# Patient Record
Sex: FEMALE | Race: WHITE | NOT HISPANIC OR LATINO | URBAN - METROPOLITAN AREA
[De-identification: names, ages, dates, MRNs, and addresses within clinical notes are randomized per-mention and may not be internally consistent; named-entity substitution may affect disease eponyms.]

---

## 2020-07-25 ENCOUNTER — TELEPHONE ENCOUNTER (OUTPATIENT)
Dept: URBAN - METROPOLITAN AREA CLINIC 13 | Facility: CLINIC | Age: 65
End: 2020-07-25

## 2020-07-25 RX ORDER — POLYETHYLENE GLYCOL 3350, SODIUM SULFATE, SODIUM CHLORIDE, POTASSIUM CHLORIDE, ASCORBIC ACID, SODIUM ASCORBATE 7.5-2.691G
USE AS DIRECTED KIT ORAL
Qty: 1 | Refills: 0 | OUTPATIENT
Start: 2014-05-20 | End: 2014-06-24

## 2020-07-26 ENCOUNTER — TELEPHONE ENCOUNTER (OUTPATIENT)
Dept: URBAN - METROPOLITAN AREA CLINIC 13 | Facility: CLINIC | Age: 65
End: 2020-07-26

## 2020-07-26 RX ORDER — LOTEPREDNOL ETABONATE 2 MG/ML
SUSPENSION/ DROPS OPHTHALMIC
Qty: 5 | Refills: 0 | Status: ACTIVE | COMMUNITY
Start: 2014-04-28

## 2020-07-26 RX ORDER — MONTELUKAST SODIUM 10 MG/1
TAKE 1 TABLET DAILY TABLET, FILM COATED ORAL
Refills: 0 | Status: ACTIVE | COMMUNITY
Start: 2014-05-20

## 2020-07-26 RX ORDER — AMOXICILLIN AND CLAVULANATE POTASSIUM 875; 125 MG/1; MG/1
TABLET, FILM COATED ORAL
Qty: 20 | Refills: 0 | Status: ACTIVE | COMMUNITY
Start: 2013-11-26

## 2020-07-26 RX ORDER — ALBUTEROL SULFATE 90 UG/1
INHALE 1 TO 2 PUFFS EVERY 4 TO 6 HOURS AS NEEDED AEROSOL, METERED RESPIRATORY (INHALATION)
Refills: 0 | Status: ACTIVE | COMMUNITY
Start: 2014-05-20

## 2020-07-26 RX ORDER — DICLOFENAC SODIUM 1 %
GEL (GRAM) TOPICAL
Qty: 255 | Refills: 0 | Status: ACTIVE | COMMUNITY
Start: 2013-09-16

## 2020-07-26 RX ORDER — MULTIVITAMIN
TAKE 1 CAPSULE DAILY CAPSULE ORAL
Refills: 0 | Status: ACTIVE | COMMUNITY

## 2020-07-26 RX ORDER — ALPRAZOLAM 0.5 MG/1
TABLET ORAL
Qty: 1 | Refills: 0 | Status: ACTIVE | COMMUNITY
Start: 2013-09-27

## 2020-07-26 RX ORDER — GABAPENTIN 300 MG/1
CAPSULE ORAL
Qty: 42 | Refills: 0 | Status: ACTIVE | COMMUNITY
Start: 2013-07-25

## 2020-07-26 RX ORDER — AMLODIPINE BESYLATE 2.5 MG/1
TAKE 1 TABLET DAILY TABLET ORAL
Refills: 0 | Status: ACTIVE | COMMUNITY
Start: 2014-05-20

## 2024-01-23 ENCOUNTER — OFFICE VISIT (OUTPATIENT)
Dept: URBAN - METROPOLITAN AREA CLINIC 113 | Facility: CLINIC | Age: 69
End: 2024-01-23
Payer: MEDICARE

## 2024-01-23 VITALS
BODY MASS INDEX: 22.36 KG/M2 | TEMPERATURE: 97.5 F | HEIGHT: 65 IN | DIASTOLIC BLOOD PRESSURE: 73 MMHG | HEART RATE: 84 BPM | WEIGHT: 134.2 LBS | RESPIRATION RATE: 20 BRPM | SYSTOLIC BLOOD PRESSURE: 119 MMHG

## 2024-01-23 DIAGNOSIS — K70.30 ALCOHOLIC CIRRHOSIS: ICD-10-CM

## 2024-01-23 DIAGNOSIS — R79.89 ELEVATED LFTS: ICD-10-CM

## 2024-01-23 PROBLEM — 420054005: Status: ACTIVE | Noted: 2024-01-23

## 2024-01-23 PROCEDURE — 99204 OFFICE O/P NEW MOD 45 MIN: CPT

## 2024-01-23 PROCEDURE — 99244 OFF/OP CNSLTJ NEW/EST MOD 40: CPT

## 2024-01-23 RX ORDER — PANTOPRAZOLE SODIUM 40 MG/1
1 TABLET TABLET, DELAYED RELEASE ORAL ONCE A DAY
Status: ACTIVE | COMMUNITY

## 2024-01-23 RX ORDER — ACETAMINOPHEN 500 MG/1
1 TABLET AS NEEDED TABLET ORAL
Status: ACTIVE | COMMUNITY

## 2024-01-23 RX ORDER — MULTIVIT-MIN/IRON/FOLIC ACID/K 18-600-40
AS DIRECTED CAPSULE ORAL
Status: ACTIVE | COMMUNITY

## 2024-01-23 RX ORDER — POLYVINYL ALCOHOL, POVIDONE 14; 6 MG/ML; MG/ML
AS DIRECTED SOLUTION/ DROPS OPHTHALMIC
Status: ACTIVE | COMMUNITY

## 2024-01-23 RX ORDER — MULTIVITAMIN WITH IRON
1 TABLET WITH A MEAL TABLET ORAL ONCE A DAY
Status: ACTIVE | COMMUNITY

## 2024-01-23 RX ORDER — CHOLECALCIFEROL (VITAMIN D3) 50 MCG
1 CAPSULE CAPSULE ORAL ONCE A DAY
Status: ACTIVE | COMMUNITY

## 2024-01-23 RX ORDER — LACTULOSE 10 G/15ML
15 ML AS NEEDED SOLUTION ORAL ONCE A DAY
Status: ACTIVE | COMMUNITY

## 2024-01-23 RX ORDER — OXYCODONE HYDROCHLORIDE 5 MG/1
1/2 TABLET AS NEEDED TABLET ORAL
Refills: 0 | Status: ACTIVE | COMMUNITY

## 2024-01-23 RX ORDER — SPIRONOLACTONE 50 MG/1
1 TABLET TABLET, FILM COATED ORAL ONCE A DAY
Status: ACTIVE | COMMUNITY

## 2024-01-23 RX ORDER — PROPRANOLOL HYDROCHLORIDE 10 MG/1
1 TABLET TABLET ORAL ONCE A DAY
Status: ACTIVE | COMMUNITY

## 2024-01-23 RX ORDER — CALCIUM CARBONATE 500 MG/1
1 TABLET TABLET ORAL ONCE A DAY
Status: ACTIVE | COMMUNITY

## 2024-01-23 RX ORDER — LORATADINE 10 MG
1 TABLET TABLET ORAL ONCE A DAY
Status: ACTIVE | COMMUNITY

## 2024-01-23 RX ORDER — ASPIRIN 81 MG/1
1 TABLET TABLET, COATED ORAL ONCE A DAY
Status: ACTIVE | COMMUNITY

## 2024-01-23 RX ORDER — ATORVASTATIN CALCIUM 20 MG/1
1 TABLET TABLET, FILM COATED ORAL ONCE A DAY
Status: ACTIVE | COMMUNITY

## 2024-01-23 RX ORDER — FLUTICASONE PROPIONATE 50 UG/1
1 SPRAY IN EACH NOSTRIL SPRAY, METERED NASAL ONCE A DAY
Status: ACTIVE | COMMUNITY

## 2024-01-23 NOTE — HPI-TODAY'S VISIT:
This is a 68-year-old woman with a history of cirrhosis, from cytopenia, hyperlipidemia.  She was referred by Dr. Blake Ramesh to establish care for her liver cirrhosis.  A copy of today's visit will be forwarded to referring provider. Her most recent labs available for review from 11/15/2023 showed low BUN 7, low serum creatinine 0.50, elevated alk phos 165, elevated AST 46, ALT 15, total bili 0.8, low RBC count 3.73, low hematocrit 32.9, low platelets 136,000, TSH 2.980, A1c 5.0%, vitamin D40.7,.  Her labs from 11/27/2023 show normal hemoglobin electrophoresis, elevated vitamin B12 1000 189, folate 19.5, ammonia 88. Last colonoscopy from 6/24/2014 showed good bowel prep, one 6 mm polyp, one 4 mm polyp, diverticulosis in the entire examined colon, non-bleeding internal hemorrhoids. Pathology revealed tubular adenoma and benign hyperplastic polyp. She is recommended repeat in 5 years. Reports she did cologuard recently that was normal. She reports March 2023 she was admitted to [facility] for vomiting blood and underwent esophageal varices banding with Dr. Peña. She has been unable to follow-up with their office. She reports she was told by Dr. 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 She fell september 2023 and states she stopped drinking that day as she broke her leg and was in rehab for sometime. She has not drank since then. Previously she would drink 3-4 glasses of wine per day. She reports one time she was donating blood and told she could not donate due to Hepatitis B infection. She does not believe she has ever been evaluated for viral, autoimmune, or hereditary etiology for liver disease. With lactulose she has regular bowel movements. There is no blood per rectum or melena. There is no abdominal bloating. No jaundice or icterus. GERD symptoms well controlled with pantoprazole

## 2024-01-29 LAB
ABSOLUTE BASOPHILS: 50
ABSOLUTE EOSINOPHILS: 281
ABSOLUTE LYMPHOCYTES: 1832
ABSOLUTE MONOCYTES: 495
ABSOLUTE NEUTROPHILS: 2844
ACTIN (SMOOTH MUSCLE) ANTIBODY (IGG): <20
AFP, SERUM, TUMOR MARKER: 4.7
ALBUMIN/GLOBULIN RATIO: 1.4
ALBUMIN: 3.6
ALKALINE PHOSPHATASE: 149
ALPHA-1-ANTITRYPSIN (AAT) PHENOTYPE: (no result)
ALT (SGPT): 9
ANA SCREEN, IFA: NEGATIVE
AST (SGOT): 29
BASOPHILS: 0.9
BILIRUBIN, DIRECT: 0.3
BILIRUBIN, INDIRECT: 0.6
BILIRUBIN, TOTAL: 0.9
COMMENT(S): (no result)
EOSINOPHILS: 5.1
FERRITIN, SERUM: 47
GLOBULIN: 2.6
HEMATOCRIT: 38.5
HEMOGLOBIN: 13
HEPATITIS A AB, TOTAL: REACTIVE
HEPATITIS B CORE AB TOTAL: (no result)
HEPATITIS B SURFACE AB IMMUNITY, QN: <5
HEPATITIS B SURFACE ANTIGEN: (no result)
HEPATITIS C ANTIBODY: (no result)
INR: 1.1
IRON BIND.CAP.(TIBC): 393
IRON SATURATION: 28
IRON: 111
LKM-1 ANTIBODY (IGG): <=20
LYMPHOCYTES: 33.3
MCH: 30.7
MCHC: 33.8
MCV: 91
MITOCHONDRIAL (M2) ANTIBODY: <=20
MONOCYTES: 9
MPV: 10
NEUTROPHILS: 51.7
PLATELET COUNT: 123
PROTEIN, TOTAL: 6.2
PT: 11.9
RDW: 14.9
RED BLOOD CELL COUNT: 4.23
WHITE BLOOD CELL COUNT: 5.5

## 2024-01-30 ENCOUNTER — TELEPHONE ENCOUNTER (OUTPATIENT)
Dept: URBAN - METROPOLITAN AREA CLINIC 113 | Facility: CLINIC | Age: 69
End: 2024-01-30

## 2024-03-27 ENCOUNTER — OV EP (OUTPATIENT)
Dept: URBAN - METROPOLITAN AREA CLINIC 113 | Facility: CLINIC | Age: 69
End: 2024-03-27

## 2024-04-24 ENCOUNTER — OV EP (OUTPATIENT)
Dept: URBAN - METROPOLITAN AREA CLINIC 113 | Facility: CLINIC | Age: 69
End: 2024-04-24
Payer: MEDICARE

## 2024-04-24 ENCOUNTER — LAB (OUTPATIENT)
Dept: URBAN - METROPOLITAN AREA CLINIC 113 | Facility: CLINIC | Age: 69
End: 2024-04-24

## 2024-04-24 VITALS — RESPIRATION RATE: 20 BRPM | TEMPERATURE: 97.2 F | HEIGHT: 65 IN | WEIGHT: 134.8 LBS | BODY MASS INDEX: 22.46 KG/M2

## 2024-04-24 DIAGNOSIS — I85.10 SECONDARY ESOPHAGEAL VARICES WITHOUT BLEEDING: ICD-10-CM

## 2024-04-24 DIAGNOSIS — K70.30 ALCOHOLIC CIRRHOSIS, UNSPECIFIED WHETHER ASCITES PRESENT: ICD-10-CM

## 2024-04-24 PROBLEM — 14223005: Status: ACTIVE | Noted: 2024-04-24

## 2024-04-24 PROCEDURE — 99213 OFFICE O/P EST LOW 20 MIN: CPT | Performed by: NURSE PRACTITIONER

## 2024-04-24 RX ORDER — POLYVINYL ALCOHOL, POVIDONE 14; 6 MG/ML; MG/ML
AS DIRECTED SOLUTION/ DROPS OPHTHALMIC
Status: ACTIVE | COMMUNITY

## 2024-04-24 RX ORDER — SPIRONOLACTONE 50 MG/1
1 TABLET TABLET, FILM COATED ORAL ONCE A DAY
Status: ACTIVE | COMMUNITY

## 2024-04-24 RX ORDER — OXYCODONE HYDROCHLORIDE 5 MG/1
1/2 TABLET AS NEEDED TABLET ORAL
Refills: 0 | Status: ACTIVE | COMMUNITY

## 2024-04-24 RX ORDER — PROPRANOLOL HYDROCHLORIDE 10 MG/1
1 TABLET TABLET ORAL ONCE A DAY
Status: ACTIVE | COMMUNITY

## 2024-04-24 RX ORDER — LACTULOSE 10 G/15ML
15 ML AS NEEDED SOLUTION ORAL ONCE A DAY
OUTPATIENT

## 2024-04-24 RX ORDER — MULTIVIT-MIN/IRON/FOLIC ACID/K 18-600-40
AS DIRECTED CAPSULE ORAL
Status: ACTIVE | COMMUNITY

## 2024-04-24 RX ORDER — SPIRONOLACTONE 50 MG/1
1 TABLET TABLET, FILM COATED ORAL ONCE A DAY
OUTPATIENT

## 2024-04-24 RX ORDER — FLUTICASONE PROPIONATE 50 UG/1
1 SPRAY IN EACH NOSTRIL SPRAY, METERED NASAL ONCE A DAY
Status: ACTIVE | COMMUNITY

## 2024-04-24 RX ORDER — CHOLECALCIFEROL (VITAMIN D3) 50 MCG
1 CAPSULE CAPSULE ORAL ONCE A DAY
Status: ACTIVE | COMMUNITY

## 2024-04-24 RX ORDER — ACETAMINOPHEN 500 MG/1
1 TABLET AS NEEDED TABLET ORAL
Status: ACTIVE | COMMUNITY

## 2024-04-24 RX ORDER — MULTIVITAMIN WITH IRON
1 TABLET WITH A MEAL TABLET ORAL ONCE A DAY
Status: ACTIVE | COMMUNITY

## 2024-04-24 RX ORDER — PANTOPRAZOLE SODIUM 40 MG/1
1 TABLET TABLET, DELAYED RELEASE ORAL ONCE A DAY
OUTPATIENT

## 2024-04-24 RX ORDER — LACTULOSE 10 G/15ML
15 ML AS NEEDED SOLUTION ORAL ONCE A DAY
Status: ACTIVE | COMMUNITY

## 2024-04-24 RX ORDER — PANTOPRAZOLE SODIUM 40 MG/1
1 TABLET TABLET, DELAYED RELEASE ORAL ONCE A DAY
Status: ACTIVE | COMMUNITY

## 2024-04-24 RX ORDER — ATORVASTATIN CALCIUM 20 MG/1
1 TABLET TABLET, FILM COATED ORAL ONCE A DAY
Status: ACTIVE | COMMUNITY

## 2024-04-24 RX ORDER — CALCIUM CARBONATE 500 MG/1
1 TABLET TABLET ORAL ONCE A DAY
Status: ACTIVE | COMMUNITY

## 2024-04-24 RX ORDER — PROPRANOLOL HYDROCHLORIDE 10 MG/1
1 TABLET TABLET ORAL ONCE A DAY
OUTPATIENT

## 2024-04-24 RX ORDER — ASPIRIN 81 MG/1
1 TABLET TABLET, COATED ORAL ONCE A DAY
Status: ACTIVE | COMMUNITY

## 2024-04-24 RX ORDER — LORATADINE 10 MG
1 TABLET TABLET ORAL ONCE A DAY
Status: ACTIVE | COMMUNITY

## 2024-04-24 NOTE — HPI-TODAY'S VISIT:
68 yo woman with a history of alcohol cirrhosis and elevated LFTs presenting for follow up. She tells me that she was diagnosed with ETOH cirrhosis in March 2023. She was hospitalized at [facility] for GI bleeding, and told she had esophageal varices. She admits a history of ETOH for several years, estimating having been a drink for the last 50 years. She stopped ETOH in September 2023. She tells me that she stopped without any support from AA or other resources. She tells me that she has completed two of three vaccinations for hepatitis A and B, and will be for her final vaccine in September 2024. Viral, autoimmune and hereditary screens for liver disease were normal. Her last abdominal imaging was in March 2023, with an abdominal US. She is due for a surveillance ultrasound for HCC. An EGD in September 2023 was notable for Grade II varices in the lower third of the esophagus s/p banding. She denies any trouble with swallowing. No heartburn or abdominal pain. She is taking pantoprazole 40 mg daily. She is on propranolol 10 mg daily for variceal prevention. She is taking lactulose as needed, estimated to be three times per week. She does not like the way it tastes and it causes diarrhea. She is sleeping okay at nighttime with exception to a pinched nerve in her neck. She denies any confusion or brain fog. She has bowel movements twice daily. There is no blood or melena. She has good volume status today. She remains on spironolactone 50 mg daily.

## 2024-07-24 ENCOUNTER — OFFICE VISIT (OUTPATIENT)
Dept: URBAN - METROPOLITAN AREA CLINIC 113 | Facility: CLINIC | Age: 69
End: 2024-07-24
Payer: MEDICARE

## 2024-07-24 ENCOUNTER — DASHBOARD ENCOUNTERS (OUTPATIENT)
Age: 69
End: 2024-07-24

## 2024-07-24 VITALS
RESPIRATION RATE: 20 BRPM | WEIGHT: 147.2 LBS | BODY MASS INDEX: 24.53 KG/M2 | TEMPERATURE: 96.8 F | HEIGHT: 65 IN | HEART RATE: 80 BPM | SYSTOLIC BLOOD PRESSURE: 116 MMHG | DIASTOLIC BLOOD PRESSURE: 66 MMHG

## 2024-07-24 DIAGNOSIS — D69.6 THROMBOCYTOPENIA: ICD-10-CM

## 2024-07-24 DIAGNOSIS — I85.10 SECONDARY ESOPHAGEAL VARICES WITHOUT BLEEDING: ICD-10-CM

## 2024-07-24 DIAGNOSIS — R16.1 SPLENOMEGALY: ICD-10-CM

## 2024-07-24 DIAGNOSIS — R79.89 ELEVATED LFTS: ICD-10-CM

## 2024-07-24 DIAGNOSIS — K42.9 UMBILICAL HERNIA WITHOUT OBSTRUCTION AND WITHOUT GANGRENE: ICD-10-CM

## 2024-07-24 DIAGNOSIS — K70.30 ALCOHOLIC CIRRHOSIS, UNSPECIFIED WHETHER ASCITES PRESENT: ICD-10-CM

## 2024-07-24 PROBLEM — 302215000: Status: ACTIVE | Noted: 2024-07-24

## 2024-07-24 PROCEDURE — 99213 OFFICE O/P EST LOW 20 MIN: CPT | Performed by: NURSE PRACTITIONER

## 2024-07-24 RX ORDER — PROPRANOLOL HYDROCHLORIDE 10 MG/1
1 TABLET TABLET ORAL ONCE A DAY
Status: ACTIVE | COMMUNITY

## 2024-07-24 RX ORDER — MULTIVIT-MIN/IRON/FOLIC ACID/K 18-600-40
AS DIRECTED CAPSULE ORAL
Status: ACTIVE | COMMUNITY

## 2024-07-24 RX ORDER — POLYVINYL ALCOHOL, POVIDONE 14; 6 MG/ML; MG/ML
AS DIRECTED SOLUTION/ DROPS OPHTHALMIC
Status: ACTIVE | COMMUNITY

## 2024-07-24 RX ORDER — LORATADINE 10 MG
1 TABLET TABLET ORAL ONCE A DAY
Status: ACTIVE | COMMUNITY

## 2024-07-24 RX ORDER — PANTOPRAZOLE SODIUM 40 MG/1
1 TABLET TABLET, DELAYED RELEASE ORAL ONCE A DAY
Status: ACTIVE | COMMUNITY

## 2024-07-24 RX ORDER — SPIRONOLACTONE 50 MG/1
1 TABLET TABLET, FILM COATED ORAL ONCE A DAY
Status: ACTIVE | COMMUNITY

## 2024-07-24 RX ORDER — FLUTICASONE PROPIONATE 50 UG/1
1 SPRAY IN EACH NOSTRIL SPRAY, METERED NASAL ONCE A DAY
Status: ACTIVE | COMMUNITY

## 2024-07-24 RX ORDER — CALCIUM CARBONATE 500 MG/1
1 TABLET TABLET ORAL ONCE A DAY
Status: ACTIVE | COMMUNITY

## 2024-07-24 RX ORDER — OXYCODONE HYDROCHLORIDE 5 MG/1
1/2 TABLET AS NEEDED TABLET ORAL
Refills: 0 | Status: ACTIVE | COMMUNITY

## 2024-07-24 RX ORDER — CHOLECALCIFEROL (VITAMIN D3) 50 MCG
1 CAPSULE CAPSULE ORAL ONCE A DAY
Status: ACTIVE | COMMUNITY

## 2024-07-24 RX ORDER — LACTULOSE 10 G/15ML
15 ML AS NEEDED SOLUTION ORAL ONCE A DAY
Status: ACTIVE | COMMUNITY

## 2024-07-24 RX ORDER — ATORVASTATIN CALCIUM 20 MG/1
1 TABLET TABLET, FILM COATED ORAL ONCE A DAY
Status: ACTIVE | COMMUNITY

## 2024-07-24 RX ORDER — ACETAMINOPHEN 500 MG/1
1 TABLET AS NEEDED TABLET ORAL
Status: ACTIVE | COMMUNITY

## 2024-07-24 RX ORDER — MULTIVITAMIN WITH IRON
1 TABLET WITH A MEAL TABLET ORAL ONCE A DAY
Status: ACTIVE | COMMUNITY

## 2024-07-24 RX ORDER — ASPIRIN 81 MG/1
1 TABLET TABLET, COATED ORAL ONCE A DAY
Status: ACTIVE | COMMUNITY

## 2024-07-24 NOTE — HPI-TODAY'S VISIT:
68 yo woman with a history of alcohol cirrhosis and elevated LFTs presenting for follow up.  She was diagnosed with ETOH cirrhosis in March 2023. She was hospitalized at [facility] for GI bleeding, and noted esophageal varices. She admits a history of ETOH for several years, estimating having been a drink for the last 50 years. She stopped ETOH in September 2023. She tells me that she stopped without any support from AA or other resources. She tells me that she has completed two of three vaccinations for hepatitis A and B, and will be for her final vaccine in September 2024. Viral, autoimmune and hereditary screens for liver disease were normal. Her last abdominal imaging was in March 2023, with an abdominal US. She is due for a surveillance ultrasound for HCC. An EGD in September 2023 was notable for Grade II varices in the lower third of the esophagus s/p banding.  Labs 6/21/24: WBC 5.7, Hg 130, Plt 99, folate 17.7, Vitamin B12 1221.  Recent notes from Dr. Ramesh describe abdominal pain related to an umbilical hernia, for which she was referred to surgery.   Abdominal US from 3/15/24: liver cirrhoisis, mild splenomegaly, no masses or lesions.  She is scheduled to see surgery next week for a consultation next week with Dr. Schneider. She denies any abdominal distention. She does not have any lower extremity edema. She does have some occasional dependent edema if she is standing frequently. The swelling dissipates with sitting. She is not on any diuretics. She is following a low salt diet. There is no jaundice, icterus or dark urine. She is sleeping okay at nighttime. She denies any confusion. She has bowel movement 2 to 3 times daily, without requiring lactulose.  There is no abdominal pain, other than some occasional bloating. There is not generally any pain around the hernia since Dr. Ramesh reduced this.

## 2024-10-01 ENCOUNTER — TELEPHONE ENCOUNTER (OUTPATIENT)
Dept: URBAN - METROPOLITAN AREA CLINIC 113 | Facility: CLINIC | Age: 69
End: 2024-10-01